# Patient Record
Sex: FEMALE | Race: WHITE | NOT HISPANIC OR LATINO
[De-identification: names, ages, dates, MRNs, and addresses within clinical notes are randomized per-mention and may not be internally consistent; named-entity substitution may affect disease eponyms.]

---

## 2018-04-11 ENCOUNTER — RX RENEWAL (OUTPATIENT)
Age: 43
End: 2018-04-11

## 2018-04-11 PROBLEM — Z00.00 ENCOUNTER FOR PREVENTIVE HEALTH EXAMINATION: Status: ACTIVE | Noted: 2018-04-11

## 2018-04-11 RX ORDER — NORETHINDRONE ACETATE AND ETHINYL ESTRADIOL AND FERROUS FUMARATE 1MG-20(21)
1-20 KIT ORAL DAILY
Qty: 90 | Refills: 0 | Status: ACTIVE | COMMUNITY
Start: 2018-04-11 | End: 1900-01-01

## 2018-05-14 ENCOUNTER — TRANSCRIPTION ENCOUNTER (OUTPATIENT)
Age: 43
End: 2018-05-14

## 2018-05-14 ENCOUNTER — APPOINTMENT (OUTPATIENT)
Dept: OBGYN | Facility: CLINIC | Age: 43
End: 2018-05-14
Payer: COMMERCIAL

## 2018-05-14 VITALS
SYSTOLIC BLOOD PRESSURE: 110 MMHG | DIASTOLIC BLOOD PRESSURE: 60 MMHG | WEIGHT: 110 LBS | BODY MASS INDEX: 18.78 KG/M2 | HEIGHT: 64 IN

## 2018-05-14 LAB
HCG UR QL: NEGATIVE
QUALITY CONTROL: YES

## 2018-05-14 PROCEDURE — 81025 URINE PREGNANCY TEST: CPT

## 2018-05-14 PROCEDURE — 99213 OFFICE O/P EST LOW 20 MIN: CPT

## 2018-09-20 ENCOUNTER — OTHER (OUTPATIENT)
Age: 43
End: 2018-09-20

## 2018-10-23 ENCOUNTER — RX RENEWAL (OUTPATIENT)
Age: 43
End: 2018-10-23

## 2018-11-10 ENCOUNTER — EMERGENCY (EMERGENCY)
Facility: HOSPITAL | Age: 43
LOS: 1 days | End: 2018-11-10
Payer: COMMERCIAL

## 2018-11-10 PROCEDURE — 74177 CT ABD & PELVIS W/CONTRAST: CPT | Mod: 26

## 2018-11-10 PROCEDURE — 99284 EMERGENCY DEPT VISIT MOD MDM: CPT

## 2018-11-10 PROCEDURE — 76856 US EXAM PELVIC COMPLETE: CPT | Mod: 26

## 2018-11-10 PROCEDURE — 76830 TRANSVAGINAL US NON-OB: CPT | Mod: 26

## 2018-11-20 ENCOUNTER — ASOB RESULT (OUTPATIENT)
Age: 43
End: 2018-11-20

## 2018-11-20 ENCOUNTER — APPOINTMENT (OUTPATIENT)
Dept: OBGYN | Facility: CLINIC | Age: 43
End: 2018-11-20
Payer: COMMERCIAL

## 2018-11-20 VITALS
WEIGHT: 110 LBS | HEIGHT: 64 IN | SYSTOLIC BLOOD PRESSURE: 110 MMHG | DIASTOLIC BLOOD PRESSURE: 70 MMHG | BODY MASS INDEX: 18.78 KG/M2

## 2018-11-20 PROCEDURE — 76857 US EXAM PELVIC LIMITED: CPT

## 2018-11-20 PROCEDURE — 99213 OFFICE O/P EST LOW 20 MIN: CPT

## 2018-11-20 PROCEDURE — 76830 TRANSVAGINAL US NON-OB: CPT

## 2018-11-27 ENCOUNTER — OUTPATIENT (OUTPATIENT)
Dept: OUTPATIENT SERVICES | Facility: HOSPITAL | Age: 43
LOS: 1 days | End: 2018-11-27

## 2018-11-27 ENCOUNTER — INPATIENT (INPATIENT)
Facility: HOSPITAL | Age: 43
LOS: 13 days | Discharge: HOME | End: 2018-12-11
Attending: FAMILY MEDICINE
Payer: COMMERCIAL

## 2018-11-27 PROCEDURE — 99285 EMERGENCY DEPT VISIT HI MDM: CPT

## 2018-11-27 PROCEDURE — 71045 X-RAY EXAM CHEST 1 VIEW: CPT | Mod: 26

## 2018-11-28 ENCOUNTER — OUTPATIENT (OUTPATIENT)
Dept: OUTPATIENT SERVICES | Facility: HOSPITAL | Age: 43
LOS: 1 days | End: 2018-11-28

## 2018-11-28 PROCEDURE — 50432 PLMT NEPHROSTOMY CATHETER: CPT | Mod: RT

## 2018-11-30 ENCOUNTER — OUTPATIENT (OUTPATIENT)
Dept: OUTPATIENT SERVICES | Facility: HOSPITAL | Age: 43
LOS: 1 days | End: 2018-11-30

## 2018-11-30 ENCOUNTER — OUTPATIENT (OUTPATIENT)
Dept: OUTPATIENT SERVICES | Facility: HOSPITAL | Age: 43
LOS: 1 days | End: 2018-11-30
Payer: COMMERCIAL

## 2018-11-30 PROCEDURE — 99232 SBSQ HOSP IP/OBS MODERATE 35: CPT

## 2018-12-01 ENCOUNTER — OUTPATIENT (OUTPATIENT)
Dept: OUTPATIENT SERVICES | Facility: HOSPITAL | Age: 43
LOS: 1 days | End: 2018-12-01

## 2018-12-02 ENCOUNTER — OUTPATIENT (OUTPATIENT)
Dept: OUTPATIENT SERVICES | Facility: HOSPITAL | Age: 43
LOS: 1 days | End: 2018-12-02

## 2018-12-03 ENCOUNTER — OUTPATIENT (OUTPATIENT)
Dept: OUTPATIENT SERVICES | Facility: HOSPITAL | Age: 43
LOS: 1 days | End: 2018-12-03

## 2018-12-04 ENCOUNTER — OUTPATIENT (OUTPATIENT)
Dept: OUTPATIENT SERVICES | Facility: HOSPITAL | Age: 43
LOS: 1 days | End: 2018-12-04

## 2018-12-05 ENCOUNTER — OUTPATIENT (OUTPATIENT)
Dept: OUTPATIENT SERVICES | Facility: HOSPITAL | Age: 43
LOS: 1 days | End: 2018-12-05

## 2018-12-05 PROCEDURE — 74177 CT ABD & PELVIS W/CONTRAST: CPT | Mod: 26

## 2018-12-06 ENCOUNTER — OUTPATIENT (OUTPATIENT)
Dept: OUTPATIENT SERVICES | Facility: HOSPITAL | Age: 43
LOS: 1 days | End: 2018-12-06

## 2018-12-06 PROCEDURE — 49406 IMAGE CATH FLUID PERI/RETRO: CPT

## 2018-12-07 ENCOUNTER — OUTPATIENT (OUTPATIENT)
Dept: OUTPATIENT SERVICES | Facility: HOSPITAL | Age: 43
LOS: 1 days | End: 2018-12-07

## 2018-12-08 ENCOUNTER — OUTPATIENT (OUTPATIENT)
Dept: OUTPATIENT SERVICES | Facility: HOSPITAL | Age: 43
LOS: 1 days | End: 2018-12-08

## 2018-12-09 ENCOUNTER — OUTPATIENT (OUTPATIENT)
Dept: OUTPATIENT SERVICES | Facility: HOSPITAL | Age: 43
LOS: 1 days | End: 2018-12-09

## 2018-12-10 ENCOUNTER — OUTPATIENT (OUTPATIENT)
Dept: OUTPATIENT SERVICES | Facility: HOSPITAL | Age: 43
LOS: 1 days | End: 2018-12-10

## 2018-12-10 PROCEDURE — 50434 CONVERT NEPHROSTOMY CATHETER: CPT | Mod: RT

## 2018-12-11 ENCOUNTER — OUTPATIENT (OUTPATIENT)
Dept: OUTPATIENT SERVICES | Facility: HOSPITAL | Age: 43
LOS: 1 days | End: 2018-12-11

## 2018-12-11 PROCEDURE — 36410 VNPNXR 3YR/> PHY/QHP DX/THER: CPT

## 2018-12-11 PROCEDURE — 76937 US GUIDE VASCULAR ACCESS: CPT | Mod: 26

## 2018-12-12 ENCOUNTER — OUTPATIENT (OUTPATIENT)
Dept: OUTPATIENT SERVICES | Facility: HOSPITAL | Age: 43
LOS: 1 days | End: 2018-12-12

## 2019-01-22 ENCOUNTER — RX RENEWAL (OUTPATIENT)
Age: 44
End: 2019-01-22

## 2019-02-08 ENCOUNTER — APPOINTMENT (OUTPATIENT)
Dept: OBGYN | Facility: CLINIC | Age: 44
End: 2019-02-08
Payer: COMMERCIAL

## 2019-02-08 VITALS
WEIGHT: 110 LBS | DIASTOLIC BLOOD PRESSURE: 78 MMHG | BODY MASS INDEX: 18.78 KG/M2 | HEIGHT: 64 IN | SYSTOLIC BLOOD PRESSURE: 114 MMHG

## 2019-02-08 PROCEDURE — 99213 OFFICE O/P EST LOW 20 MIN: CPT

## 2019-02-08 NOTE — DISCUSSION/SUMMARY
[FreeTextEntry1] : a43 years old white female came to the office urgent visit was the ultrasound showed small fibroids physical exam within normal limits pelvic exam within normal limits patient is reassured that the fibroids are small and they were contained in the size she has one nabothian cyst it's responsible for the enlarged cervix were denuded ángela LUNSFORD spent 15 minutes with a

## 2019-02-10 LAB — HPV HIGH+LOW RISK DNA PNL CVX: NOT DETECTED

## 2019-02-15 LAB — CYTOLOGY CVX/VAG DOC THIN PREP: NORMAL

## 2019-02-22 ENCOUNTER — RESULT REVIEW (OUTPATIENT)
Age: 44
End: 2019-02-22

## 2019-04-17 ENCOUNTER — APPOINTMENT (OUTPATIENT)
Dept: NUCLEAR MEDICINE | Facility: CLINIC | Age: 44
End: 2019-04-17
Payer: COMMERCIAL

## 2019-04-17 PROCEDURE — 78708 K FLOW/FUNCT IMAGE W/DRUG: CPT

## 2019-04-17 PROCEDURE — A9539A: CUSTOM

## 2019-05-26 ENCOUNTER — RX RENEWAL (OUTPATIENT)
Age: 44
End: 2019-05-26

## 2019-05-26 ENCOUNTER — TRANSCRIPTION ENCOUNTER (OUTPATIENT)
Age: 44
End: 2019-05-26

## 2019-05-26 RX ORDER — NORETHINDRONE ACETATE AND ETHINYL ESTRADIOL AND FERROUS FUMARATE 1.5-30(21)
1.5-3 KIT ORAL
Qty: 84 | Refills: 1 | Status: ACTIVE | COMMUNITY
Start: 2018-10-23 | End: 1900-01-01

## 2019-06-05 ENCOUNTER — APPOINTMENT (OUTPATIENT)
Dept: UROLOGY | Facility: CLINIC | Age: 44
End: 2019-06-05
Payer: COMMERCIAL

## 2019-06-05 VITALS
DIASTOLIC BLOOD PRESSURE: 81 MMHG | BODY MASS INDEX: 18.78 KG/M2 | WEIGHT: 110 LBS | RESPIRATION RATE: 17 BRPM | SYSTOLIC BLOOD PRESSURE: 111 MMHG | TEMPERATURE: 98.5 F | HEART RATE: 90 BPM | HEIGHT: 64 IN

## 2019-06-05 DIAGNOSIS — Z87.09 PERSONAL HISTORY OF OTHER DISEASES OF THE RESPIRATORY SYSTEM: ICD-10-CM

## 2019-06-05 DIAGNOSIS — Z84.1 FAMILY HISTORY OF DISORDERS OF KIDNEY AND URETER: ICD-10-CM

## 2019-06-05 PROCEDURE — 99213 OFFICE O/P EST LOW 20 MIN: CPT

## 2019-06-05 NOTE — ASSESSMENT
[FreeTextEntry1] : 45 y/o F w/ complex R sided ureteral stricture disease\par - nephrostogram performed today, appears the nephrostomy is not completely in the kidney, after examining the CT scan, it appears the nephrostomy tube may be placed into the renal cyst\par We discussed the options of reconstruction . Depending what we see when we do retrograde pyelogram in the OR , that will determine what next step would be . Reconstruction with Buccal flap , It is unclear by the films today where or if there is a structure or if the cyst is pressing on the ureter or if the stone is causing obstruction

## 2019-06-05 NOTE — HISTORY OF PRESENT ILLNESS
[FreeTextEntry1] : 43 y/o F h/o nephrolithaisis here for evaluation of complex stricture disease. Pt states she was in her usual state of health until she developed renal colic in November in [facility] and she was found to have a R ureteral stone. She was evaluated by Dr. Bipin Mix who attempted to place a stent however the stone was too impacted thus she required a perc nephrostomy tube placement and RP drain. There was extrav at the time of cysto which is why she had a RP drain placed. She had repeat imaging which demonstrated improvement of her collection. They changed her to a nephroureteral tube and performed a ureteroscopy once had access down into her bladder. During the time of ureteroscopy, she was noted to have significant scarring of the proximal ureter and renal pelvis. The lower pole of her kidney apepared to be obliterated. At this time, a stent was placed. A MAG3 renal scan was done showing decreased renal function on the R side as well as suggestion of UPJO. She needed a repeat nephrostomy tube placement after her stent was removed b/c of accumulation of hydronephrosis once again. Antegrade nephrostogram was done once as well as RPG showing a small, intra-renal pelvis and ureter being obliterated at the level of the renal pelvis.\par \par MAG3 renal scan showed 30% right kidney, t1/2 >30 minutes bilaterally

## 2019-06-05 NOTE — PHYSICAL EXAM
[General Appearance - Well Developed] : well developed [Edema] : no peripheral edema [General Appearance - Well Nourished] : well nourished [] : no respiratory distress [Exaggerated Use Of Accessory Muscles For Inspiration] : no accessory muscle use [No Focal Deficits] : no focal deficits [Normal Station and Gait] : the gait and station were normal for the patient's age [FreeTextEntry1] : no suprapubic tenderness, no flank tenderness

## 2019-06-06 PROBLEM — Z87.09 HISTORY OF ASTHMA: Status: RESOLVED | Noted: 2019-06-05 | Resolved: 2019-06-06

## 2019-06-06 PROBLEM — Z84.1 FAMILY HISTORY OF KIDNEY STONES: Status: ACTIVE | Noted: 2019-06-05

## 2019-06-10 LAB — BACTERIA UR CULT: ABNORMAL

## 2019-06-12 ENCOUNTER — OUTPATIENT (OUTPATIENT)
Dept: OUTPATIENT SERVICES | Facility: HOSPITAL | Age: 44
LOS: 1 days | End: 2019-06-12

## 2019-06-12 VITALS
SYSTOLIC BLOOD PRESSURE: 120 MMHG | HEART RATE: 73 BPM | TEMPERATURE: 97 F | WEIGHT: 117.07 LBS | RESPIRATION RATE: 16 BRPM | OXYGEN SATURATION: 98 % | DIASTOLIC BLOOD PRESSURE: 80 MMHG | HEIGHT: 63 IN

## 2019-06-12 DIAGNOSIS — N13.5 CROSSING VESSEL AND STRICTURE OF URETER WITHOUT HYDRONEPHROSIS: ICD-10-CM

## 2019-06-12 DIAGNOSIS — Z98.891 HISTORY OF UTERINE SCAR FROM PREVIOUS SURGERY: Chronic | ICD-10-CM

## 2019-06-12 DIAGNOSIS — J45.909 UNSPECIFIED ASTHMA, UNCOMPLICATED: ICD-10-CM

## 2019-06-12 DIAGNOSIS — Z96.0 PRESENCE OF UROGENITAL IMPLANTS: Chronic | ICD-10-CM

## 2019-06-12 DIAGNOSIS — F41.9 ANXIETY DISORDER, UNSPECIFIED: ICD-10-CM

## 2019-06-12 DIAGNOSIS — Z98.890 OTHER SPECIFIED POSTPROCEDURAL STATES: Chronic | ICD-10-CM

## 2019-06-12 LAB
ANION GAP SERPL CALC-SCNC: 12 MMO/L — SIGNIFICANT CHANGE UP (ref 7–14)
APPEARANCE UR: CLEAR — SIGNIFICANT CHANGE UP
BACTERIA # UR AUTO: SIGNIFICANT CHANGE UP
BILIRUB UR-MCNC: NEGATIVE — SIGNIFICANT CHANGE UP
BLOOD UR QL VISUAL: HIGH
BUN SERPL-MCNC: 20 MG/DL — SIGNIFICANT CHANGE UP (ref 7–23)
CALCIUM SERPL-MCNC: 9.9 MG/DL — SIGNIFICANT CHANGE UP (ref 8.4–10.5)
CHLORIDE SERPL-SCNC: 105 MMOL/L — SIGNIFICANT CHANGE UP (ref 98–107)
CO2 SERPL-SCNC: 23 MMOL/L — SIGNIFICANT CHANGE UP (ref 22–31)
COLOR SPEC: SIGNIFICANT CHANGE UP
CREAT SERPL-MCNC: 0.79 MG/DL — SIGNIFICANT CHANGE UP (ref 0.5–1.3)
GLUCOSE SERPL-MCNC: 105 MG/DL — HIGH (ref 70–99)
GLUCOSE UR-MCNC: NEGATIVE — SIGNIFICANT CHANGE UP
HCT VFR BLD CALC: 40.3 % — SIGNIFICANT CHANGE UP (ref 34.5–45)
HGB BLD-MCNC: 13.3 G/DL — SIGNIFICANT CHANGE UP (ref 11.5–15.5)
HYALINE CASTS # UR AUTO: NEGATIVE — SIGNIFICANT CHANGE UP
KETONES UR-MCNC: NEGATIVE — SIGNIFICANT CHANGE UP
LEUKOCYTE ESTERASE UR-ACNC: SIGNIFICANT CHANGE UP
MCHC RBC-ENTMCNC: 29.9 PG — SIGNIFICANT CHANGE UP (ref 27–34)
MCHC RBC-ENTMCNC: 33 % — SIGNIFICANT CHANGE UP (ref 32–36)
MCV RBC AUTO: 90.6 FL — SIGNIFICANT CHANGE UP (ref 80–100)
NITRITE UR-MCNC: NEGATIVE — SIGNIFICANT CHANGE UP
NRBC # FLD: 0 K/UL — SIGNIFICANT CHANGE UP (ref 0–0)
PH UR: 6.5 — SIGNIFICANT CHANGE UP (ref 5–8)
PLATELET # BLD AUTO: 318 K/UL — SIGNIFICANT CHANGE UP (ref 150–400)
PMV BLD: 9.4 FL — SIGNIFICANT CHANGE UP (ref 7–13)
POTASSIUM SERPL-MCNC: 4.6 MMOL/L — SIGNIFICANT CHANGE UP (ref 3.5–5.3)
POTASSIUM SERPL-SCNC: 4.6 MMOL/L — SIGNIFICANT CHANGE UP (ref 3.5–5.3)
PROT UR-MCNC: 10 — SIGNIFICANT CHANGE UP
RBC # BLD: 4.45 M/UL — SIGNIFICANT CHANGE UP (ref 3.8–5.2)
RBC # FLD: 13.1 % — SIGNIFICANT CHANGE UP (ref 10.3–14.5)
RBC CASTS # UR COMP ASSIST: HIGH (ref 0–?)
SODIUM SERPL-SCNC: 140 MMOL/L — SIGNIFICANT CHANGE UP (ref 135–145)
SP GR SPEC: 1.02 — SIGNIFICANT CHANGE UP (ref 1–1.04)
SQUAMOUS # UR AUTO: SIGNIFICANT CHANGE UP
UROBILINOGEN FLD QL: NORMAL — SIGNIFICANT CHANGE UP
WBC # BLD: 7.05 K/UL — SIGNIFICANT CHANGE UP (ref 3.8–10.5)
WBC # FLD AUTO: 7.05 K/UL — SIGNIFICANT CHANGE UP (ref 3.8–10.5)
WBC UR QL: HIGH (ref 0–?)

## 2019-06-12 NOTE — H&P PST ADULT - NSICDXPROBLEM_GEN_ALL_CORE_FT
PROBLEM DIAGNOSES  Problem: Crossing vessel and stricture of ureter without hydronephrosis  Assessment and Plan: Scheduled for  Cystoscopy, Right Ureteroscopy, Retrograde Pyelogram, Possible Pyeloplasty, Possible Onlay Buccal, Possible Appendical Onlay, Excision of Stone on 06/20/19.  Lab results pending.  Preop, famotidine, urine collection and chlorhexidine written and verbal instructions provided and questions addressed.    Problem: Anxiety and depression  Assessment and Plan: Pt instructed to take meds on the morning of procedure.    Problem: Asthma  Assessment and Plan: Pt instructed to take med on morning of procedure.

## 2019-06-12 NOTE — H&P PST ADULT - RS GEN PE MLT RESP DETAILS PC
respirations non-labored/breath sounds equal/no rhonchi/no wheezes/clear to auscultation bilaterally/no rales

## 2019-06-12 NOTE — H&P PST ADULT - LYMPHATIC
posterior cervical L/supraclavicular L/anterior cervical L/supraclavicular R/posterior cervical R/anterior cervical R

## 2019-06-12 NOTE — H&P PST ADULT - GASTROINTESTINAL DETAILS
soft/no masses palpable/no guarding/no distention/bowel sounds normal/no rebound tenderness/no rigidity/no organomegaly/no bruit/nontender

## 2019-06-12 NOTE — H&P PST ADULT - HISTORY OF PRESENT ILLNESS
44 yr old female with medical hx anxiety/depression and asthma presents for preop evaluation with dx of Crossing Vessel and Stricture of Ureter without Hydronephrosis after having right sided abdominal pains in November 2018.  Pt was evaluated at ED [facility] and was dx with Kidney stones and Urosepsis Pt is now scheduled Cystoscopy, Right Ureteroscopy, Retrograde Pyelogram, Possible Pyeloplasty, Possible Onlay Buccal, Possible Appendical Onlay, Excision of Stone on 06/20/19.

## 2019-06-12 NOTE — H&P PST ADULT - NSANTHOSAYNRD_GEN_A_CORE
No. PENELOPE screening performed.  STOP BANG Legend: 0-2 = LOW Risk; 3-4 = INTERMEDIATE Risk; 5-8 = HIGH Risk

## 2019-06-12 NOTE — H&P PST ADULT - NSICDXPASTSURGICALHX_GEN_ALL_CORE_FT
PAST SURGICAL HISTORY:  H/O cystoscopy removal of stent    History of   &     S/P cystoscopy with ureteral stent placement

## 2019-06-13 LAB — SPECIMEN SOURCE: SIGNIFICANT CHANGE UP

## 2019-06-14 LAB
-  AMIKACIN: SIGNIFICANT CHANGE UP
-  AMPICILLIN/SULBACTAM: SIGNIFICANT CHANGE UP
-  AMPICILLIN: SIGNIFICANT CHANGE UP
-  AZTREONAM: SIGNIFICANT CHANGE UP
-  CEFAZOLIN: SIGNIFICANT CHANGE UP
-  CEFEPIME: SIGNIFICANT CHANGE UP
-  CEFOXITIN: SIGNIFICANT CHANGE UP
-  CEFTAZIDIME: SIGNIFICANT CHANGE UP
-  CEFTRIAXONE: SIGNIFICANT CHANGE UP
-  CIPROFLOXACIN: SIGNIFICANT CHANGE UP
-  ERTAPENEM: SIGNIFICANT CHANGE UP
-  GENTAMICIN: SIGNIFICANT CHANGE UP
-  IMIPENEM: SIGNIFICANT CHANGE UP
-  LEVOFLOXACIN: SIGNIFICANT CHANGE UP
-  MEROPENEM: SIGNIFICANT CHANGE UP
-  NITROFURANTOIN: SIGNIFICANT CHANGE UP
-  PIPERACILLIN/TAZOBACTAM: SIGNIFICANT CHANGE UP
-  TIGECYCLINE: SIGNIFICANT CHANGE UP
-  TOBRAMYCIN: SIGNIFICANT CHANGE UP
-  TRIMETHOPRIM/SULFAMETHOXAZOLE: SIGNIFICANT CHANGE UP
BACTERIA UR CULT: SIGNIFICANT CHANGE UP
METHOD TYPE: SIGNIFICANT CHANGE UP
ORGANISM # SPEC MICROSCOPIC CNT: SIGNIFICANT CHANGE UP

## 2019-06-19 ENCOUNTER — TRANSCRIPTION ENCOUNTER (OUTPATIENT)
Age: 44
End: 2019-06-19

## 2019-06-20 ENCOUNTER — INPATIENT (INPATIENT)
Facility: HOSPITAL | Age: 44
LOS: 1 days | Discharge: HOME | End: 2019-06-22
Attending: UROLOGY | Admitting: UROLOGY
Payer: COMMERCIAL

## 2019-06-20 ENCOUNTER — RESULT REVIEW (OUTPATIENT)
Age: 44
End: 2019-06-20

## 2019-06-20 ENCOUNTER — APPOINTMENT (OUTPATIENT)
Dept: UROLOGY | Facility: HOSPITAL | Age: 44
End: 2019-06-20

## 2019-06-20 VITALS
HEIGHT: 63 IN | HEART RATE: 67 BPM | SYSTOLIC BLOOD PRESSURE: 129 MMHG | WEIGHT: 117.07 LBS | TEMPERATURE: 98 F | RESPIRATION RATE: 15 BRPM | DIASTOLIC BLOOD PRESSURE: 93 MMHG | OXYGEN SATURATION: 98 %

## 2019-06-20 DIAGNOSIS — Z96.0 PRESENCE OF UROGENITAL IMPLANTS: Chronic | ICD-10-CM

## 2019-06-20 DIAGNOSIS — N13.5 CROSSING VESSEL AND STRICTURE OF URETER WITHOUT HYDRONEPHROSIS: ICD-10-CM

## 2019-06-20 DIAGNOSIS — J45.20 MILD INTERMITTENT ASTHMA, UNCOMPLICATED: ICD-10-CM

## 2019-06-20 DIAGNOSIS — Z98.891 HISTORY OF UTERINE SCAR FROM PREVIOUS SURGERY: Chronic | ICD-10-CM

## 2019-06-20 DIAGNOSIS — Z98.890 OTHER SPECIFIED POSTPROCEDURAL STATES: Chronic | ICD-10-CM

## 2019-06-20 DIAGNOSIS — D72.829 ELEVATED WHITE BLOOD CELL COUNT, UNSPECIFIED: ICD-10-CM

## 2019-06-20 LAB
ANION GAP SERPL CALC-SCNC: 14 MMO/L — SIGNIFICANT CHANGE UP (ref 7–14)
BLD GP AB SCN SERPL QL: NEGATIVE — SIGNIFICANT CHANGE UP
BUN SERPL-MCNC: 13 MG/DL — SIGNIFICANT CHANGE UP (ref 7–23)
CALCIUM SERPL-MCNC: 8.1 MG/DL — LOW (ref 8.4–10.5)
CHLORIDE SERPL-SCNC: 100 MMOL/L — SIGNIFICANT CHANGE UP (ref 98–107)
CO2 SERPL-SCNC: 25 MMOL/L — SIGNIFICANT CHANGE UP (ref 22–31)
CREAT SERPL-MCNC: 0.84 MG/DL — SIGNIFICANT CHANGE UP (ref 0.5–1.3)
GLUCOSE SERPL-MCNC: 129 MG/DL — HIGH (ref 70–99)
HCG UR QL: NEGATIVE — SIGNIFICANT CHANGE UP
HCT VFR BLD CALC: 35.5 % — SIGNIFICANT CHANGE UP (ref 34.5–45)
HGB BLD-MCNC: 11.8 G/DL — SIGNIFICANT CHANGE UP (ref 11.5–15.5)
MCHC RBC-ENTMCNC: 30.3 PG — SIGNIFICANT CHANGE UP (ref 27–34)
MCHC RBC-ENTMCNC: 33.2 % — SIGNIFICANT CHANGE UP (ref 32–36)
MCV RBC AUTO: 91.3 FL — SIGNIFICANT CHANGE UP (ref 80–100)
NRBC # FLD: 0 K/UL — SIGNIFICANT CHANGE UP (ref 0–0)
PLATELET # BLD AUTO: 251 K/UL — SIGNIFICANT CHANGE UP (ref 150–400)
PMV BLD: 9.3 FL — SIGNIFICANT CHANGE UP (ref 7–13)
POTASSIUM SERPL-MCNC: 3.5 MMOL/L — SIGNIFICANT CHANGE UP (ref 3.5–5.3)
POTASSIUM SERPL-SCNC: 3.5 MMOL/L — SIGNIFICANT CHANGE UP (ref 3.5–5.3)
RBC # BLD: 3.89 M/UL — SIGNIFICANT CHANGE UP (ref 3.8–5.2)
RBC # FLD: 12.8 % — SIGNIFICANT CHANGE UP (ref 10.3–14.5)
RH IG SCN BLD-IMP: POSITIVE — SIGNIFICANT CHANGE UP
RH IG SCN BLD-IMP: POSITIVE — SIGNIFICANT CHANGE UP
SODIUM SERPL-SCNC: 139 MMOL/L — SIGNIFICANT CHANGE UP (ref 135–145)
WBC # BLD: 15.1 K/UL — HIGH (ref 3.8–10.5)
WBC # FLD AUTO: 15.1 K/UL — HIGH (ref 3.8–10.5)

## 2019-06-20 PROCEDURE — 88305 TISSUE EXAM BY PATHOLOGIST: CPT | Mod: 26

## 2019-06-20 PROCEDURE — 88331 PATH CONSLTJ SURG 1 BLK 1SPC: CPT | Mod: 26

## 2019-06-20 PROCEDURE — 99223 1ST HOSP IP/OBS HIGH 75: CPT

## 2019-06-20 PROCEDURE — 50546 LAPAROSCOPIC NEPHRECTOMY: CPT | Mod: RT

## 2019-06-20 PROCEDURE — 52332 CYSTOSCOPY AND TREATMENT: CPT | Mod: RT

## 2019-06-20 PROCEDURE — 88307 TISSUE EXAM BY PATHOLOGIST: CPT | Mod: 26

## 2019-06-20 RX ORDER — ACETAMINOPHEN 500 MG
1000 TABLET ORAL ONCE
Refills: 0 | Status: COMPLETED | OUTPATIENT
Start: 2019-06-20 | End: 2019-06-20

## 2019-06-20 RX ORDER — MONTELUKAST 4 MG/1
10 TABLET, CHEWABLE ORAL AT BEDTIME
Refills: 0 | Status: DISCONTINUED | OUTPATIENT
Start: 2019-06-20 | End: 2019-06-22

## 2019-06-20 RX ORDER — ACETAMINOPHEN 500 MG
1000 TABLET ORAL ONCE
Refills: 0 | Status: COMPLETED | OUTPATIENT
Start: 2019-06-21 | End: 2019-06-21

## 2019-06-20 RX ORDER — SERTRALINE 25 MG/1
100 TABLET, FILM COATED ORAL AT BEDTIME
Refills: 0 | Status: DISCONTINUED | OUTPATIENT
Start: 2019-06-20 | End: 2019-06-22

## 2019-06-20 RX ORDER — ERTAPENEM SODIUM 1 G/1
1000 INJECTION, POWDER, LYOPHILIZED, FOR SOLUTION INTRAMUSCULAR; INTRAVENOUS ONCE
Refills: 0 | Status: DISCONTINUED | OUTPATIENT
Start: 2019-06-20 | End: 2019-06-20

## 2019-06-20 RX ORDER — SERTRALINE 25 MG/1
2 TABLET, FILM COATED ORAL
Qty: 0 | Refills: 0 | DISCHARGE

## 2019-06-20 RX ORDER — KETOROLAC TROMETHAMINE 30 MG/ML
15 SYRINGE (ML) INJECTION EVERY 6 HOURS
Refills: 0 | Status: DISCONTINUED | OUTPATIENT
Start: 2019-06-20 | End: 2019-06-22

## 2019-06-20 RX ORDER — ONDANSETRON 8 MG/1
4 TABLET, FILM COATED ORAL ONCE
Refills: 0 | Status: COMPLETED | OUTPATIENT
Start: 2019-06-20 | End: 2019-06-20

## 2019-06-20 RX ORDER — ALBUTEROL 90 UG/1
2 AEROSOL, METERED ORAL EVERY 6 HOURS
Refills: 0 | Status: DISCONTINUED | OUTPATIENT
Start: 2019-06-20 | End: 2019-06-22

## 2019-06-20 RX ORDER — POTASSIUM CHLORIDE 20 MEQ
10 PACKET (EA) ORAL
Refills: 0 | Status: COMPLETED | OUTPATIENT
Start: 2019-06-20 | End: 2019-06-20

## 2019-06-20 RX ORDER — POTASSIUM CHLORIDE 20 MEQ
40 PACKET (EA) ORAL ONCE
Refills: 0 | Status: DISCONTINUED | OUTPATIENT
Start: 2019-06-20 | End: 2019-06-20

## 2019-06-20 RX ORDER — MONTELUKAST 4 MG/1
1 TABLET, CHEWABLE ORAL
Qty: 0 | Refills: 0 | DISCHARGE

## 2019-06-20 RX ORDER — LANOLIN ALCOHOL/MO/W.PET/CERES
1 CREAM (GRAM) TOPICAL
Qty: 0 | Refills: 0 | DISCHARGE

## 2019-06-20 RX ORDER — SERTRALINE 25 MG/1
200 TABLET, FILM COATED ORAL
Refills: 0 | Status: DISCONTINUED | OUTPATIENT
Start: 2019-06-20 | End: 2019-06-20

## 2019-06-20 RX ORDER — ALBUTEROL 90 UG/1
2 AEROSOL, METERED ORAL
Qty: 0 | Refills: 0 | DISCHARGE

## 2019-06-20 RX ORDER — SODIUM CHLORIDE 9 MG/ML
1000 INJECTION, SOLUTION INTRAVENOUS
Refills: 0 | Status: DISCONTINUED | OUTPATIENT
Start: 2019-06-20 | End: 2019-06-21

## 2019-06-20 RX ORDER — NORETHINDRONE AND ETHINYL ESTRADIOL 0.4-0.035
1 KIT ORAL DAILY
Refills: 0 | Status: DISCONTINUED | OUTPATIENT
Start: 2019-06-20 | End: 2019-06-22

## 2019-06-20 RX ORDER — ERTAPENEM SODIUM 1 G/1
INJECTION, POWDER, LYOPHILIZED, FOR SOLUTION INTRAMUSCULAR; INTRAVENOUS
Refills: 0 | Status: DISCONTINUED | OUTPATIENT
Start: 2019-06-20 | End: 2019-06-20

## 2019-06-20 RX ORDER — HEPARIN SODIUM 5000 [USP'U]/ML
5000 INJECTION INTRAVENOUS; SUBCUTANEOUS EVERY 8 HOURS
Refills: 0 | Status: DISCONTINUED | OUTPATIENT
Start: 2019-06-20 | End: 2019-06-22

## 2019-06-20 RX ORDER — HYDROMORPHONE HYDROCHLORIDE 2 MG/ML
0.5 INJECTION INTRAMUSCULAR; INTRAVENOUS; SUBCUTANEOUS
Refills: 0 | Status: DISCONTINUED | OUTPATIENT
Start: 2019-06-20 | End: 2019-06-20

## 2019-06-20 RX ORDER — NORETHINDRONE AND ETHINYL ESTRADIOL 0.4-0.035
1 KIT ORAL
Qty: 0 | Refills: 0 | DISCHARGE

## 2019-06-20 RX ADMIN — Medication 400 MILLIGRAM(S): at 20:03

## 2019-06-20 RX ADMIN — Medication 400 MILLIGRAM(S): at 14:41

## 2019-06-20 RX ADMIN — Medication 1000 MILLIGRAM(S): at 15:55

## 2019-06-20 RX ADMIN — HEPARIN SODIUM 5000 UNIT(S): 5000 INJECTION INTRAVENOUS; SUBCUTANEOUS at 22:01

## 2019-06-20 RX ADMIN — HEPARIN SODIUM 5000 UNIT(S): 5000 INJECTION INTRAVENOUS; SUBCUTANEOUS at 14:41

## 2019-06-20 RX ADMIN — ONDANSETRON 4 MILLIGRAM(S): 8 TABLET, FILM COATED ORAL at 15:00

## 2019-06-20 RX ADMIN — MONTELUKAST 10 MILLIGRAM(S): 4 TABLET, CHEWABLE ORAL at 22:01

## 2019-06-20 RX ADMIN — Medication 15 MILLIGRAM(S): at 18:08

## 2019-06-20 RX ADMIN — SODIUM CHLORIDE 100 MILLILITER(S): 9 INJECTION, SOLUTION INTRAVENOUS at 22:02

## 2019-06-20 RX ADMIN — Medication 100 MILLIEQUIVALENT(S): at 16:20

## 2019-06-20 RX ADMIN — Medication 15 MILLIGRAM(S): at 17:42

## 2019-06-20 RX ADMIN — NORETHINDRONE AND ETHINYL ESTRADIOL 1 TABLET(S): KIT at 22:01

## 2019-06-20 RX ADMIN — SERTRALINE 100 MILLIGRAM(S): 25 TABLET, FILM COATED ORAL at 22:01

## 2019-06-20 RX ADMIN — Medication 100 MILLIEQUIVALENT(S): at 16:46

## 2019-06-20 RX ADMIN — Medication 15 MILLIGRAM(S): at 22:01

## 2019-06-20 RX ADMIN — SODIUM CHLORIDE 100 MILLILITER(S): 9 INJECTION, SOLUTION INTRAVENOUS at 15:00

## 2019-06-20 RX ADMIN — Medication 100 MILLIEQUIVALENT(S): at 19:43

## 2019-06-20 NOTE — CONSULT NOTE ADULT - PROBLEM SELECTOR RECOMMENDATION 4
post op management per urology, early ambulation, await return of bowel function, pain control, IVFs, IS, DVT ppx (SC hep)

## 2019-06-20 NOTE — CONSULT NOTE ADULT - ASSESSMENT
44F anxiety/depression, asthma, in November 2018 seen at [facility] for R sided abdominal pain, and was dx with Kidney stones and Urosepsis, found to have R ureteral stricture, ended up with nephrostomy tube.  Now s/p R lap radical nephrectomy.

## 2019-06-20 NOTE — CONSULT NOTE ADULT - SUBJECTIVE AND OBJECTIVE BOX
Patient is a 44y old  Female who presents with a chief complaint of R ureteral stricture (2019 16:51)    HPI:  44F anxiety/depression, asthma presents for preop evaluation with dx of Crossing Vessel and Stricture of Ureter without Hydronephrosis after having right sided abdominal pains in 2018.  Pt was evaluated at [facility] and was dx with Kidney stones and Urosepsis.      Allergies:  No Known Allergies    HOME MEDICATIONS:  · 	sertraline 100 mg oral tablet: Last Dose Taken:  , 2 tab(s) orally 2 times a day  · 	busPIRone 15 mg oral tablet: Last Dose Taken:  , 1 tab(s) orally 2 times a day  · 	Singulair 10 mg oral tablet: Last Dose Taken:  , 1 tab(s) orally once a day (at bedtime)  · 	Junel Fe .530 oral tablet: Last Dose Taken:  , 1 tab(s) orally once a day (at bedtime)  · 	albuterol 90 mcg/inh inhalation aerosol: Last Dose Taken:  , 2 puff(s) inhaled 4 times a day, As Needed  · 	Melatonin 10 mg oral capsule: Last Dose Taken:  , 1 cap(s) orally once a day (at bedtime)    MEDICATIONS  (STANDING):  acetaminophen  IVPB .. 1000 milliGRAM(s) IV Intermittent once  acetaminophen  IVPB .. 1000 milliGRAM(s) IV Intermittent once  busPIRone 15 milliGRAM(s) Oral two times a day  heparin  Injectable 5000 Unit(s) SubCutaneous every 8 hours  ketorolac   Injectable 15 milliGRAM(s) IV Push every 6 hours  lactated ringers. 1000 milliLiter(s) (100 mL/Hr) IV Continuous <Continuous>  montelukast 10 milliGRAM(s) Oral at bedtime  potassium chloride  10 mEq/100 mL IVPB 10 milliEquivalent(s) IV Intermittent every 1 hour  sertraline 200 milliGRAM(s) Oral two times a day    MEDICATIONS  (PRN):  ALBUTerol    90 MICROgram(s) HFA Inhaler 2 Puff(s) Inhalation every 6 hours PRN Shortness of Breath and/or Wheezing  HYDROmorphone  Injectable 0.5 milliGRAM(s) IV Push every 10 minutes PRN Severe Pain (7 - 10)      PAST MEDICAL & SURGICAL HISTORY:  Asthma  Anxiety and depression  History of :  &amp;   H/O cystoscopy: removal of stent  S/P cystoscopy with ureteral stent placement  [ ] Reviewed     SOCIAL HISTORY:  Residence: [ ] [facility]  [ ] SNF  [ ] Community  [ ] Substance abuse:   [ ] Tobacco:   [ ] Alcohol use:     FAMILY HISTORY:  No pertinent family history in first degree relatives  [ ] No pertinent family history in first degree relatives     REVIEW OF SYSTEMS:  CONSTITUTIONAL: No fever, weight loss, or fatigue  EYES: No eye pain, visual disturbances, or discharge  ENMT:  No difficulty hearing, tinnitus, vertigo; No sinus or throat pain  NECK: No pain or stiffness  BREASTS: No pain, masses, or nipple discharge  RESPIRATORY: No cough, wheezing, chills or hemoptysis; No shortness of breath  CARDIOVASCULAR: No chest pain, palpitations, dizziness, or leg swelling  GASTROINTESTINAL: No epigastric pain. No vomiting, or hematemesis; No diarrhea or constipation. No melena or hematochezia.  GENITOURINARY: R NT about one month  NEUROLOGICAL: No headaches, memory loss, loss of strength, or tremors  SKIN: No itching, burning, rashes, or lesions   LYMPH NODES: No enlarged glands  ENDOCRINE: No heat or cold intolerance; No hair loss  MUSCULOSKELETAL: No muscle or back pain  PSYCHIATRIC: No depression, anxiety, mood swings, or difficulty sleeping  HEME/LYMPH: No easy bruising, or bleeding gums  ALLERGY AND IMMUNOLOGIC: No hives or eczema  [ X ] All other ROS negative  [  ] Unable to obtain due to poor mental status    Vital Signs Last 24 Hrs  T(C): 36.4 (2019 16:54), Max: 36.9 (2019 15:00)  T(F): 97.6 (2019 16:54), Max: 98.5 (2019 15:00)  HR: 69 (2019 16:54) (67 - 80)  BP: 106/66 (2019 16:54) (103/62 - 129/93)  BP(mean): 75 (2019 15:00) (68 - 77)  RR: 17 (2019 16:54) (13 - 22)  SpO2: 95% (2019 16:54) (95% - 98%)    PHYSICAL EXAM:  GENERAL: NAD, well-groomed, well-developed  HEAD:  Atraumatic, Normocephalic  EYES: EOMI, PERRLA, conjunctiva and sclera clear  ENMT: Moist mucous membranes  NECK: Supple, No JVD  RESPIRATORY: Clear to auscultation bilaterally; No rales, rhonchi, wheezing, or rubs  CARDIOVASCULAR: Regular rate and rhythm; No murmurs, rubs, or gallops  GASTROINTESTINAL: Soft, incisions intact;  R NT removed, site clean  GENITOURINARY: +gibbs  EXTREMITIES:  2+ Peripheral Pulses, No clubbing, cyanosis, or edema  NERVOUS SYSTEM:  Alert & Oriented X3; Moving all 4 extremities; No gross sensory deficits  HEME/LYMPH: No lymphadenopathy noted  SKIN: No rashes or lesions  PSYCH: calm, appropriate    LABS:                        11.8   15.10 )-----------( 251      ( 2019 13:30 )             35.5     06-20    139  |  100  |  13  ----------------------------<  129<H>  3.5   |  25  |  0.84    Ca    8.1<L>      2019 13:30    RADIOLOGY & ADDITIONAL STUDIES:    EKG:   Personally Reviewed:  [ ] YES     Imaging:   Personally Reviewed:  [ ] YES               Consultant(s) notes reviewed:    Care Discussed with Consultant(s)/Other Providers:  urology re overall care Patient is a 44y old  Female who presents with a chief complaint of R ureteral stricture (2019 16:51)    HPI:  44F anxiety/depression, asthma (no h/o intubation), in 2018 seen at [facility] for R sided abdominal pain, and was dx with Kidney stones and Urosepsis, found to have R ureteral stricture, ended up with nephrostomy tube.  Now s/p R lap radical nephrectomy.  Doing ok after surgery.  Feels weak and tired.  Pain at surgical sites as expected.  No chest pain, SOB.  Mild nausea after surgery, no vomiting.  Since able to tolerate some sips of water.      Allergies:  No Known Allergies    HOME MEDICATIONS:  · 	sertraline 100 mg oral tablet: Last Dose Taken:  ,taking 100hs  · 	busPIRone 15 mg oral tablet: Last Dose Taken:  taking 15hs  · 	Singulair 10 mg oral tablet: Last Dose Taken:  , 1 tab(s) orally once a day (at bedtime)  · 	Junel Fe  oral tablet: Last Dose Taken:  , 1 tab(s) orally once a day (at bedtime)  · 	albuterol 90 mcg/inh inhalation aerosol: Last Dose Taken:  , 2 puff(s) inhaled 4 times a day, As Needed - not used in >1month  · 	Melatonin 10 mg oral capsule: Last Dose Taken:  , 1 cap(s) orally once a day (at bedtime) PRN    MEDICATIONS  (STANDING):  acetaminophen  IVPB .. 1000 milliGRAM(s) IV Intermittent once  acetaminophen  IVPB .. 1000 milliGRAM(s) IV Intermittent once  busPIRone 15 milliGRAM(s) Oral two times a day  heparin  Injectable 5000 Unit(s) SubCutaneous every 8 hours  ketorolac   Injectable 15 milliGRAM(s) IV Push every 6 hours  lactated ringers. 1000 milliLiter(s) (100 mL/Hr) IV Continuous <Continuous>  montelukast 10 milliGRAM(s) Oral at bedtime  potassium chloride  10 mEq/100 mL IVPB 10 milliEquivalent(s) IV Intermittent every 1 hour  sertraline 200 milliGRAM(s) Oral two times a day    MEDICATIONS  (PRN):  ALBUTerol    90 MICROgram(s) HFA Inhaler 2 Puff(s) Inhalation every 6 hours PRN Shortness of Breath and/or Wheezing  HYDROmorphone  Injectable 0.5 milliGRAM(s) IV Push every 10 minutes PRN Severe Pain (7 - 10)    PAST MEDICAL & SURGICAL HISTORY:  Asthma  Anxiety and depression  History of : 2004 &amp; 2005  H/O cystoscopy: removal of stent  S/P cystoscopy with ureteral stent placement    SOCIAL HISTORY:  , 2 children, no tob/drugs, rare alcohol    FAMILY HISTORY:  No pertinent family history in first degree relatives    REVIEW OF SYSTEMS:  CONSTITUTIONAL: No fever, weight loss, or fatigue  EYES: No eye pain, visual disturbances, or discharge  ENMT:  No difficulty hearing, tinnitus, vertigo; No sinus or throat pain  NECK: No pain or stiffness  BREASTS: No pain, masses, or nipple discharge  RESPIRATORY: No cough, wheezing, chills or hemoptysis; No shortness of breath  CARDIOVASCULAR: No chest pain, palpitations, dizziness, or leg swelling  GASTROINTESTINAL: No epigastric pain. No vomiting, or hematemesis; No diarrhea or constipation. No melena or hematochezia.  GENITOURINARY: R NT about one month  NEUROLOGICAL: No headaches, memory loss, loss of strength, or tremors  SKIN: No itching, burning, rashes, or lesions   LYMPH NODES: No enlarged glands  ENDOCRINE: No heat or cold intolerance; No hair loss  MUSCULOSKELETAL: No muscle or back pain  PSYCHIATRIC: No depression, anxiety, mood swings, or difficulty sleeping  HEME/LYMPH: No easy bruising, or bleeding gums  ALLERGY AND IMMUNOLOGIC: No hives or eczema  [ X ] All other ROS negative  [  ] Unable to obtain due to poor mental status    Vital Signs Last 24 Hrs  T(C): 36.4 (2019 16:54), Max: 36.9 (2019 15:00)  T(F): 97.6 (2019 16:54), Max: 98.5 (2019 15:00)  HR: 69 (2019 16:54) (67 - 80)  BP: 106/66 (2019 16:54) (103/62 - 129/93)  BP(mean): 75 (2019 15:00) (68 - 77)  RR: 17 (2019 16:54) (13 - 22)  SpO2: 95% (2019 16:54) (95% - 98%)    PHYSICAL EXAM:  GENERAL: NAD, well-groomed, well-developed  HEAD:  Atraumatic, Normocephalic  EYES: EOMI, PERRLA, conjunctiva and sclera clear  ENMT: Moist mucous membranes  NECK: Supple, No JVD  RESPIRATORY: Clear to auscultation bilaterally; No rales, rhonchi, wheezing, or rubs  CARDIOVASCULAR: Regular rate and rhythm; No murmurs, rubs, or gallops  GASTROINTESTINAL: Soft, incisions intact;  R NT removed, site clean  GENITOURINARY: +gibbs  EXTREMITIES:  2+ Peripheral Pulses, No clubbing, cyanosis, or edema  NERVOUS SYSTEM:  Alert & Oriented X3; Moving all 4 extremities; No gross sensory deficits  HEME/LYMPH: No lymphadenopathy noted  SKIN: No rashes or lesions  PSYCH: calm, appropriate    LABS:                        11.8   15.10 )-----------( 251      ( 2019 13:30 )             35.5     06-20    139  |  100  |  13  ----------------------------<  129<H>  3.5   |  25  |  0.84    Ca    8.1<L>      2019 13:30    RADIOLOGY & ADDITIONAL STUDIES:    EKG:   Personally Reviewed:  [ ] YES     Imaging:   Personally Reviewed:  [ ] YES               Consultant(s) notes reviewed:    Care Discussed with Consultant(s)/Other Providers:  urology re overall care

## 2019-06-20 NOTE — PROGRESS NOTE ADULT - SUBJECTIVE AND OBJECTIVE BOX
Note    Post op Check    s/p : R simple nephrectomy    Pt seen / examined c/o R finger numbness, abdominal pain 5/10    Vital Signs Last 24 Hrs  T(C): 36.9 (20 Jun 2019 15:00), Max: 36.9 (20 Jun 2019 15:00)  T(F): 98.5 (20 Jun 2019 15:00), Max: 98.5 (20 Jun 2019 15:00)  HR: 76 (20 Jun 2019 15:00) (67 - 80)  BP: 106/65 (20 Jun 2019 15:00) (103/62 - 129/93)  BP(mean): 75 (20 Jun 2019 15:00) (68 - 77)  RR: 18 (20 Jun 2019 15:00) (13 - 22)  SpO2: 95% (20 Jun 2019 15:00) (95% - 98%)    I&O's Summary    20 Jun 2019 07:01  -  20 Jun 2019 16:52  --------------------------------------------------------  IN: 200 mL / OUT: 1450 mL / NET: -1250 mL    EBL=1000  Fol=1450    PHYSICAL EXAM:       Constitutional: awake alert NAD    Respiratory: no resp distress    Cardiovascular: RR    Gastrointestinal: soft, appropriately tender trocar sites CDI    Genitourinary: gibbs in place draining well yellow    Extremities: + decreased sensation in third fourth and fifth digits of R hand, ROM and strength intact cap refill 2+                          11.8   15.10 )-----------( 251      ( 20 Jun 2019 13:30 )             35.5       06-20    139  |  100  |  13  ----------------------------<  129<H>  3.5   |  25  |  0.84    Ca    8.1<L>      20 Jun 2019 13:30

## 2019-06-20 NOTE — CONSULT NOTE ADULT - PROBLEM SELECTOR RECOMMENDATION 3
post op management per urology, early ambulation, await return of bowel function, pain control, IVFs, IS, DVT ppx (SC hep) c/w singulair, albuterol PRN

## 2019-06-21 ENCOUNTER — TRANSCRIPTION ENCOUNTER (OUTPATIENT)
Age: 44
End: 2019-06-21

## 2019-06-21 LAB
ANION GAP SERPL CALC-SCNC: 9 MMO/L — SIGNIFICANT CHANGE UP (ref 7–14)
BUN SERPL-MCNC: 9 MG/DL — SIGNIFICANT CHANGE UP (ref 7–23)
CALCIUM SERPL-MCNC: 8.4 MG/DL — SIGNIFICANT CHANGE UP (ref 8.4–10.5)
CHLORIDE SERPL-SCNC: 102 MMOL/L — SIGNIFICANT CHANGE UP (ref 98–107)
CO2 SERPL-SCNC: 25 MMOL/L — SIGNIFICANT CHANGE UP (ref 22–31)
CREAT SERPL-MCNC: 0.84 MG/DL — SIGNIFICANT CHANGE UP (ref 0.5–1.3)
GLUCOSE SERPL-MCNC: 97 MG/DL — SIGNIFICANT CHANGE UP (ref 70–99)
HCT VFR BLD CALC: 30 % — LOW (ref 34.5–45)
HGB BLD-MCNC: 10 G/DL — LOW (ref 11.5–15.5)
MCHC RBC-ENTMCNC: 30.4 PG — SIGNIFICANT CHANGE UP (ref 27–34)
MCHC RBC-ENTMCNC: 33.3 % — SIGNIFICANT CHANGE UP (ref 32–36)
MCV RBC AUTO: 91.2 FL — SIGNIFICANT CHANGE UP (ref 80–100)
NRBC # FLD: 0 K/UL — SIGNIFICANT CHANGE UP (ref 0–0)
PLATELET # BLD AUTO: 248 K/UL — SIGNIFICANT CHANGE UP (ref 150–400)
PMV BLD: 9.7 FL — SIGNIFICANT CHANGE UP (ref 7–13)
POTASSIUM SERPL-MCNC: 4.3 MMOL/L — SIGNIFICANT CHANGE UP (ref 3.5–5.3)
POTASSIUM SERPL-SCNC: 4.3 MMOL/L — SIGNIFICANT CHANGE UP (ref 3.5–5.3)
RBC # BLD: 3.29 M/UL — LOW (ref 3.8–5.2)
RBC # FLD: 12.7 % — SIGNIFICANT CHANGE UP (ref 10.3–14.5)
SODIUM SERPL-SCNC: 136 MMOL/L — SIGNIFICANT CHANGE UP (ref 135–145)
WBC # BLD: 10.85 K/UL — HIGH (ref 3.8–10.5)
WBC # FLD AUTO: 10.85 K/UL — HIGH (ref 3.8–10.5)

## 2019-06-21 PROCEDURE — 99232 SBSQ HOSP IP/OBS MODERATE 35: CPT

## 2019-06-21 RX ORDER — SODIUM CHLORIDE 9 MG/ML
1000 INJECTION, SOLUTION INTRAVENOUS
Refills: 0 | Status: DISCONTINUED | OUTPATIENT
Start: 2019-06-21 | End: 2019-06-21

## 2019-06-21 RX ORDER — ACETAMINOPHEN 500 MG
650 TABLET ORAL EVERY 6 HOURS
Refills: 0 | Status: DISCONTINUED | OUTPATIENT
Start: 2019-06-21 | End: 2019-06-22

## 2019-06-21 RX ORDER — LANOLIN ALCOHOL/MO/W.PET/CERES
3 CREAM (GRAM) TOPICAL AT BEDTIME
Refills: 0 | Status: DISCONTINUED | OUTPATIENT
Start: 2019-06-21 | End: 2019-06-22

## 2019-06-21 RX ADMIN — Medication 15 MILLIGRAM(S): at 11:15

## 2019-06-21 RX ADMIN — Medication 15 MILLIGRAM(S): at 13:43

## 2019-06-21 RX ADMIN — Medication 15 MILLIGRAM(S): at 18:29

## 2019-06-21 RX ADMIN — Medication 15 MILLIGRAM(S): at 05:24

## 2019-06-21 RX ADMIN — Medication 400 MILLIGRAM(S): at 02:34

## 2019-06-21 RX ADMIN — Medication 650 MILLIGRAM(S): at 21:14

## 2019-06-21 RX ADMIN — HEPARIN SODIUM 5000 UNIT(S): 5000 INJECTION INTRAVENOUS; SUBCUTANEOUS at 05:24

## 2019-06-21 RX ADMIN — Medication 15 MILLIGRAM(S): at 18:28

## 2019-06-21 RX ADMIN — HEPARIN SODIUM 5000 UNIT(S): 5000 INJECTION INTRAVENOUS; SUBCUTANEOUS at 15:21

## 2019-06-21 RX ADMIN — Medication 15 MILLIGRAM(S): at 21:13

## 2019-06-21 RX ADMIN — HEPARIN SODIUM 5000 UNIT(S): 5000 INJECTION INTRAVENOUS; SUBCUTANEOUS at 21:14

## 2019-06-21 RX ADMIN — SERTRALINE 100 MILLIGRAM(S): 25 TABLET, FILM COATED ORAL at 21:13

## 2019-06-21 RX ADMIN — Medication 10 MILLIGRAM(S): at 09:29

## 2019-06-21 RX ADMIN — Medication 400 MILLIGRAM(S): at 07:57

## 2019-06-21 RX ADMIN — SODIUM CHLORIDE 75 MILLILITER(S): 9 INJECTION, SOLUTION INTRAVENOUS at 08:17

## 2019-06-21 RX ADMIN — MONTELUKAST 10 MILLIGRAM(S): 4 TABLET, CHEWABLE ORAL at 21:13

## 2019-06-21 RX ADMIN — Medication 15 MILLIGRAM(S): at 00:54

## 2019-06-21 RX ADMIN — Medication 3 MILLIGRAM(S): at 21:14

## 2019-06-21 RX ADMIN — NORETHINDRONE AND ETHINYL ESTRADIOL 1 TABLET(S): KIT at 21:13

## 2019-06-21 RX ADMIN — Medication 650 MILLIGRAM(S): at 22:10

## 2019-06-21 RX ADMIN — SODIUM CHLORIDE 100 MILLILITER(S): 9 INJECTION, SOLUTION INTRAVENOUS at 00:54

## 2019-06-21 NOTE — PROGRESS NOTE ADULT - PROBLEM SELECTOR PLAN 1
Strict I&O's  Analgesia Antiemetics  DVT prophylaxis  Incentive spirometry  Clears / OOB  AM labs  Dulcolax this AM, will advance diet as tolerated  appreciate medicine hospitalist input

## 2019-06-21 NOTE — DISCHARGE NOTE NURSING/CASE MANAGEMENT/SOCIAL WORK - NSDCDPATPORTLINK_GEN_ALL_CORE
You can access the [facility] Portal, offered by [facility], by registering with the following website: [email or web address]

## 2019-06-21 NOTE — DISCHARGE NOTE NURSING/CASE MANAGEMENT/SOCIAL WORK - NSDPDISTO_GEN_ALL_CORE
Home/Pt. is afebrile and offers no complaints. In no acute distress. Abdominal steri strips: clean, dry and intact. Pt is ambulating,  tolerating diet well, and voiding in adequate amounts.

## 2019-06-21 NOTE — DISCHARGE NOTE NURSING/CASE MANAGEMENT/SOCIAL WORK - NSDCPNINST_GEN_ALL_CORE
Make a follow up appointment with Dr. Grajeda. Call MD if you develop a fever, or if there is redness, swelling, drainage or pain not relieved by pain medication. No heavy lifting, bending, or straining to move your bowels. Take over the counter stool softeners as needed to prevent constipation which may be caused by pain medication. Drink plenty of liquids.

## 2019-06-21 NOTE — PROGRESS NOTE ADULT - SUBJECTIVE AND OBJECTIVE BOX
Subjective  Reports mild abdominal soreness  Passed 1 watery BM overnight, awaiting flatus, feels bloated    Objective    Vital signs  T(F): , Max: 98.6 (06-20-19 @ 20:44)  HR: 80 (06-21-19 @ 05:21)  BP: 90/54 (06-21-19 @ 05:21)  SpO2: 99% (06-21-19 @ 05:21)  Gibbs - 2L clear yellow    Gen: NAD  Abd: Soft, incisions c/d/i with steri-strips in place  : gibbs removed, draining as above    Labs      06-20 @ 13:30    WBC 15.10 / Hct 35.5  / SCr 0.84

## 2019-06-21 NOTE — PROGRESS NOTE ADULT - SUBJECTIVE AND OBJECTIVE BOX
ANESTHESIA POSTOP CHECK    44y Female POSTOP DAY 1 S/P   [x ] General Anesthesia  [ ] Joni Anesthesia  [ ] MAC    Vital Signs Last 24 Hrs  T(C): 37.1 (21 Jun 2019 09:26), Max: 37.1 (21 Jun 2019 09:26)  T(F): 98.7 (21 Jun 2019 09:26), Max: 98.7 (21 Jun 2019 09:26)  HR: 79 (21 Jun 2019 09:26) (68 - 80)  BP: 93/54 (21 Jun 2019 09:26) (90/54 - 111/65)  BP(mean): 75 (20 Jun 2019 15:00) (68 - 77)  RR: 16 (21 Jun 2019 09:26) (13 - 22)  SpO2: 99% (21 Jun 2019 09:26) (95% - 99%)  I&O's Summary    20 Jun 2019 07:01  -  21 Jun 2019 07:00  --------------------------------------------------------  IN: 600 mL / OUT: 3600 mL / NET: -3000 mL        [x ] NO APPARENT ANESTHESIA COMPLICATIONS      Comments:

## 2019-06-21 NOTE — PROGRESS NOTE ADULT - ASSESSMENT
44F anxiety/depression, asthma, in November 2018 seen at [facility] for R sided abdominal pain, and was dx with Kidney stones and Urosepsis, found to have R ureteral stricture, ended up with nephrostomy tube.  Now s/p R lap radical nephrectomy.

## 2019-06-21 NOTE — PROGRESS NOTE ADULT - SUBJECTIVE AND OBJECTIVE BOX
Patient is a 44y old  Female who presents with a chief complaint of renal mass (21 Jun 2019 07:08)      SUBJECTIVE / OVERNIGHT EVENTS: no events feels well     ROS:  No HA/DZ  No Vision changes   No CP, SOB  No N/V/D  No Edema  No Rash  NO weakness, numbness    MEDICATIONS  (STANDING):  busPIRone 15 milliGRAM(s) Oral <User Schedule>  ethinyl  estradiol-norethindrone (JUNEL FE) 1 Tablet(s) Oral daily  heparin  Injectable 5000 Unit(s) SubCutaneous every 8 hours  ketorolac   Injectable 15 milliGRAM(s) IV Push every 6 hours  montelukast 10 milliGRAM(s) Oral at bedtime  sertraline 100 milliGRAM(s) Oral at bedtime    MEDICATIONS  (PRN):  ALBUTerol    90 MICROgram(s) HFA Inhaler 2 Puff(s) Inhalation every 6 hours PRN Shortness of Breath and/or Wheezing      T(C): 37.1 (06-21-19 @ 09:26)  HR: 79 (06-21-19 @ 09:26)  BP: 93/54 (06-21-19 @ 09:26)  RR: 16 (06-21-19 @ 09:26)  SpO2: 99% (06-21-19 @ 09:26)  CAPILLARY BLOOD GLUCOSE        I&O's Summary    20 Jun 2019 07:01  -  21 Jun 2019 07:00  --------------------------------------------------------  IN: 600 mL / OUT: 3600 mL / NET: -3000 mL    21 Jun 2019 07:01  -  21 Jun 2019 10:57  --------------------------------------------------------  IN: 0 mL / OUT: 400 mL / NET: -400 mL        PHYSICAL EXAM:  GENERAL: NAD, well-developed, AOx3  HEAD:  Atraumatic, Normocephalic  EYES: EOMI, PERRL, conjunctiva and sclera clear  NECK: Supple, No JVD  CHEST/LUNG: Clear to auscultation bilaterally  HEART: Regular rate and rhythm; No murmurs, rubs, or gallops, No Edema  ABDOMEN: Soft, Nontender, Nondistended; Bowel sounds present  EXTREMITIES:  2+ Peripheral Pulses, No clubbing, cyanosis  PSYCH: No SI/HI  NEUROLOGY: non-focal  SKIN: Dressing c/D/I     LABS:                        10.0   10.85 )-----------( 248      ( 21 Jun 2019 06:50 )             30.0     06-21    136  |  102  |  9   ----------------------------<  97  4.3   |  25  |  0.84    Ca    8.4      21 Jun 2019 06:50                    RADIOLOGY & ADDITIONAL TESTS:    Imaging Personally Reviewed:    Consultant(s) Notes Reviewed:      Care Discussed with Consultants/Other Providers: Urology PA

## 2019-06-22 ENCOUNTER — TRANSCRIPTION ENCOUNTER (OUTPATIENT)
Age: 44
End: 2019-06-22

## 2019-06-22 VITALS
TEMPERATURE: 98 F | SYSTOLIC BLOOD PRESSURE: 104 MMHG | RESPIRATION RATE: 16 BRPM | OXYGEN SATURATION: 97 % | HEART RATE: 75 BPM | DIASTOLIC BLOOD PRESSURE: 60 MMHG

## 2019-06-22 PROCEDURE — 99232 SBSQ HOSP IP/OBS MODERATE 35: CPT

## 2019-06-22 RX ORDER — IBUPROFEN 200 MG
1 TABLET ORAL
Qty: 20 | Refills: 0
Start: 2019-06-22

## 2019-06-22 RX ORDER — ACETAMINOPHEN 500 MG
3 TABLET ORAL
Qty: 0 | Refills: 0 | DISCHARGE
Start: 2019-06-22

## 2019-06-22 RX ADMIN — HEPARIN SODIUM 5000 UNIT(S): 5000 INJECTION INTRAVENOUS; SUBCUTANEOUS at 06:02

## 2019-06-22 RX ADMIN — Medication 15 MILLIGRAM(S): at 00:35

## 2019-06-22 RX ADMIN — Medication 15 MILLIGRAM(S): at 06:02

## 2019-06-22 NOTE — PROGRESS NOTE ADULT - PROBLEM SELECTOR PLAN 1
-- OOB/Ambulate  -- Pain control  -- Monitor GI Fxn  -- d/c home today  appreciate medicine hospitalist input

## 2019-06-22 NOTE — DISCHARGE NOTE PROVIDER - CARE PROVIDER_API CALL
Familia Grajeda; PETRONA)  Urology  [address] [place] [address]  Phone: [phone]  Fax: [phone]  Follow Up Time:

## 2019-06-22 NOTE — PROGRESS NOTE ADULT - SUBJECTIVE AND OBJECTIVE BOX
Subjective  No overnight events. Pt doing well. Continues to pass flatus, (+) OOB, No n/v. No problems voiding    Objective    Vital signs  T(F): , Max: 98.4 (06-22-19 @ 01:13)  HR: 75 (06-22-19 @ 09:58)  BP: 104/60 (06-22-19 @ 09:58)  SpO2: 97% (06-22-19 @ 09:58)  Wt(kg): --    Output     06-21 @ 07:01  -  06-22 @ 07:00  --------------------------------------------------------  IN: 0 mL / OUT: 1200 mL / NET: -1200 mL      Gen NAD  Abd soft, ND, NT.  Incisions c/d/i    Labs      06-21 @ 06:50    WBC 10.85 / Hct 30.0  / SCr 0.84     06-20 @ 13:30    WBC 15.10 / Hct 35.5  / SCr 0.84

## 2019-06-22 NOTE — PROGRESS NOTE ADULT - ASSESSMENT
44F anxiety/depression, asthma, in November 2018 seen at [facility] for R sided abdominal pain, and was dx with Kidney stones and Urosepsis, found to have R ureteral stricture, ended up with nephrostomy tube.  Now s/p R lap radical nephrectomy.

## 2019-06-22 NOTE — DISCHARGE NOTE PROVIDER - NSDCCPCAREPLAN_GEN_ALL_CORE_FT
PRINCIPAL DISCHARGE DIAGNOSIS  Diagnosis: Ureteral stricture, right  Assessment and Plan of Treatment: Drink plenty of fluids.  No heavy lifting (greater than 10 pounds) or straining for 4 to 6 weeks.  You may shower, just pat white strips dry, they will fall off in a few weeks.   Call Dr. Grajeda's office  to schedule a follow up appointment.  Call the office if you have fever greater than 101, difficulty urinating, pain not relieved with pain medication, nausea/vomiting.        SECONDARY DISCHARGE DIAGNOSES  Diagnosis: Anxiety and depression  Assessment and Plan of Treatment: Continue current home medications and follow up with your primary care provider      Diagnosis: Mild intermittent asthma without complication  Assessment and Plan of Treatment: Continue current home medications and follow up with your primary care provider

## 2019-06-22 NOTE — DISCHARGE NOTE PROVIDER - HOSPITAL COURSE
43 yo F  underwent lap simple fight nephrectomy on 6/20/19.  Postoperative course uneventful, successful TOV on POD #1,  pain controlled, ambulating.  Return of GI function on POD #1 , diet advanced without incident.   Pt d/c on POD #2 to f/u with Dr. Grajeda.

## 2019-06-22 NOTE — DISCHARGE NOTE PROVIDER - NSDCACTIVITY_GEN_ALL_CORE
No heavy lifting/straining/Walking - Indoors allowed/Walking - Outdoors allowed/Stairs allowed/Showering allowed

## 2019-06-25 PROBLEM — J45.909 UNSPECIFIED ASTHMA, UNCOMPLICATED: Chronic | Status: ACTIVE | Noted: 2019-06-12

## 2019-06-25 PROBLEM — F41.9 ANXIETY DISORDER, UNSPECIFIED: Chronic | Status: ACTIVE | Noted: 2019-06-12

## 2019-07-08 ENCOUNTER — APPOINTMENT (OUTPATIENT)
Dept: UROLOGY | Facility: CLINIC | Age: 44
End: 2019-07-08
Payer: COMMERCIAL

## 2019-07-08 DIAGNOSIS — N28.9 DISORDER OF KIDNEY AND URETER, UNSPECIFIED: ICD-10-CM

## 2019-07-08 PROCEDURE — 99024 POSTOP FOLLOW-UP VISIT: CPT

## 2019-07-08 NOTE — HISTORY OF PRESENT ILLNESS
[None] : no symptoms [FreeTextEntry1] : Previous patient of Dr stephany Mix . Recurrent stricture disease . He placed a nephrostomy tube was placed . He referred her to Dr Grajeda for definitive treatment . Due to extensive scarring and disease a nephrectomy was performed . Benign pathology

## 2019-07-08 NOTE — ASSESSMENT
[FreeTextEntry1] : Senia is slowly progressing . Her wounds are still  healing .She is still experiencing fatigue . I assured her these\par symptoms will improve . I did labs on her today to check her creatinine . I will extend her TOM at this time and see her in 4 weeks if she is not improving . \par I discussed a kidney healthy diet . No added salt , modified protein and hydration . She can start cardio if she wants but no lifting at this time . restrictions for another 3 weeks .

## 2019-07-08 NOTE — REVIEW OF SYSTEMS
[Feeling Poorly] : feeling poorly [Feeling Tired] : feeling tired [see HPI] : see HPI [Skin Wound] : skin wound [Negative] : Psychiatric

## 2019-07-08 NOTE — PHYSICAL EXAM
[General Appearance - Well Developed] : well developed [General Appearance - Well Nourished] : well nourished [Bowel Sounds] : normal bowel sounds [Abdomen Soft] : soft [Skin Color & Pigmentation] : normal skin color and pigmentation [Skin Turgor] : supple [] : no respiratory distress [Oriented To Time, Place, And Person] : oriented to person, place, and time [Normal Station and Gait] : the gait and station were normal for the patient's age [No Focal Deficits] : no focal deficits [No Palpable Adenopathy] : no palpable adenopathy [FreeTextEntry1] : incisions with slight redness and scabs

## 2019-07-09 LAB
ANION GAP SERPL CALC-SCNC: 13 MMOL/L
BUN SERPL-MCNC: 17 MG/DL
CALCIUM SERPL-MCNC: 9.5 MG/DL
CHLORIDE SERPL-SCNC: 104 MMOL/L
CO2 SERPL-SCNC: 23 MMOL/L
CREAT SERPL-MCNC: 0.99 MG/DL
GLUCOSE SERPL-MCNC: 85 MG/DL
POTASSIUM SERPL-SCNC: 4.4 MMOL/L
SODIUM SERPL-SCNC: 140 MMOL/L

## 2019-07-19 ENCOUNTER — OTHER (OUTPATIENT)
Age: 44
End: 2019-07-19

## 2019-08-12 ENCOUNTER — RX RENEWAL (OUTPATIENT)
Age: 44
End: 2019-08-12

## 2019-08-12 RX ORDER — NORETHINDRONE ACETATE AND ETHINYL ESTRADIOL AND FERROUS FUMARATE 1MG-20(21)
1-20 KIT ORAL DAILY
Qty: 84 | Refills: 0 | Status: ACTIVE | COMMUNITY
Start: 2018-04-11 | End: 1900-01-01

## 2019-10-07 NOTE — H&P PST ADULT - PRO PAIN EXPRESSION
----- Message from Elizabeth Olmstead MD sent at 10/5/2019  7:29 PM CDT -----  No infection  
Informed pt's  of message below.  
verbalization

## 2019-10-22 ENCOUNTER — RX CHANGE (OUTPATIENT)
Age: 44
End: 2019-10-22

## 2019-10-22 DIAGNOSIS — N39.0 URINARY TRACT INFECTION, SITE NOT SPECIFIED: ICD-10-CM

## 2019-10-22 DIAGNOSIS — R31.9 URINARY TRACT INFECTION, SITE NOT SPECIFIED: ICD-10-CM

## 2019-12-09 ENCOUNTER — APPOINTMENT (OUTPATIENT)
Dept: OBGYN | Facility: CLINIC | Age: 44
End: 2019-12-09
Payer: COMMERCIAL

## 2019-12-09 VITALS
BODY MASS INDEX: 19.63 KG/M2 | SYSTOLIC BLOOD PRESSURE: 112 MMHG | DIASTOLIC BLOOD PRESSURE: 76 MMHG | WEIGHT: 115 LBS | HEIGHT: 64 IN

## 2019-12-09 DIAGNOSIS — Z30.430 ENCOUNTER FOR INSERTION OF INTRAUTERINE CONTRACEPTIVE DEVICE: ICD-10-CM

## 2019-12-09 PROCEDURE — 58300 INSERT INTRAUTERINE DEVICE: CPT | Mod: 53

## 2019-12-09 RX ORDER — CIPROFLOXACIN HYDROCHLORIDE 500 MG/1
500 TABLET, FILM COATED ORAL
Qty: 14 | Refills: 0 | Status: DISCONTINUED | COMMUNITY
Start: 2019-10-22 | End: 2019-12-09

## 2019-12-09 RX ORDER — NORETHINDRONE ACETATE AND ETHINYL ESTRADIOL AND FERROUS FUMARATE 1.5-30(21)
1.5-3 KIT ORAL DAILY
Qty: 90 | Refills: 3 | Status: ACTIVE | COMMUNITY
Start: 2019-07-22 | End: 1900-01-01

## 2020-01-10 ENCOUNTER — APPOINTMENT (OUTPATIENT)
Dept: UROLOGY | Facility: CLINIC | Age: 45
End: 2020-01-10
Payer: COMMERCIAL

## 2020-01-10 ENCOUNTER — OUTPATIENT (OUTPATIENT)
Dept: OUTPATIENT SERVICES | Facility: HOSPITAL | Age: 45
LOS: 1 days | End: 2020-01-10
Payer: COMMERCIAL

## 2020-01-10 DIAGNOSIS — Z98.890 OTHER SPECIFIED POSTPROCEDURAL STATES: Chronic | ICD-10-CM

## 2020-01-10 DIAGNOSIS — N13.5 CROSSING VESSEL AND STRICTURE OF URETER W/OUT HYDRONEPHROSIS: ICD-10-CM

## 2020-01-10 DIAGNOSIS — Z96.0 PRESENCE OF UROGENITAL IMPLANTS: Chronic | ICD-10-CM

## 2020-01-10 DIAGNOSIS — Z98.891 HISTORY OF UTERINE SCAR FROM PREVIOUS SURGERY: Chronic | ICD-10-CM

## 2020-01-10 DIAGNOSIS — R35.0 FREQUENCY OF MICTURITION: ICD-10-CM

## 2020-01-10 PROCEDURE — 76775 US EXAM ABDO BACK WALL LIM: CPT | Mod: 26

## 2020-01-10 PROCEDURE — 76775 US EXAM ABDO BACK WALL LIM: CPT

## 2020-01-10 PROCEDURE — 99213 OFFICE O/P EST LOW 20 MIN: CPT | Mod: 25

## 2020-01-10 NOTE — ASSESSMENT
[FreeTextEntry1] : 43 yo female s/p simple nephrectomy without issues on ultrasound today. Patient can f/u PRN

## 2020-01-10 NOTE — HISTORY OF PRESENT ILLNESS
[FreeTextEntry1] : 43 yo female with chronic stricture disease and atrophic kidney s/p simple nephrectomy. Ultrasound done today without any significant findings.

## 2020-01-10 NOTE — PHYSICAL EXAM
[Normal Appearance] : normal appearance [Abdomen Soft] : soft [Abdomen Tenderness] : non-tender [Well Groomed] : well groomed [] : no rash [Edema] : no peripheral edema [Exaggerated Use Of Accessory Muscles For Inspiration] : no accessory muscle use [Normal Station and Gait] : the gait and station were normal for the patient's age [Affect] : the affect was normal

## 2020-01-16 DIAGNOSIS — N13.5 CROSSING VESSEL AND STRICTURE OF URETER WITHOUT HYDRONEPHROSIS: ICD-10-CM

## 2020-02-10 ENCOUNTER — LABORATORY RESULT (OUTPATIENT)
Age: 45
End: 2020-02-10

## 2020-02-10 ENCOUNTER — APPOINTMENT (OUTPATIENT)
Dept: OBGYN | Facility: CLINIC | Age: 45
End: 2020-02-10
Payer: COMMERCIAL

## 2020-02-10 VITALS
HEIGHT: 64 IN | BODY MASS INDEX: 19.29 KG/M2 | WEIGHT: 113 LBS | SYSTOLIC BLOOD PRESSURE: 108 MMHG | DIASTOLIC BLOOD PRESSURE: 58 MMHG

## 2020-02-10 DIAGNOSIS — Z01.419 ENCOUNTER FOR GYNECOLOGICAL EXAMINATION (GENERAL) (ROUTINE) W/OUT ABNORMAL FINDINGS: ICD-10-CM

## 2020-02-10 DIAGNOSIS — Z30.41 ENCOUNTER FOR SURVEILLANCE OF CONTRACEPTIVE PILLS: ICD-10-CM

## 2020-02-10 PROCEDURE — 99396 PREV VISIT EST AGE 40-64: CPT

## 2020-02-10 RX ORDER — MONTELUKAST SODIUM 10 MG/1
TABLET, FILM COATED ORAL
Refills: 0 | Status: ACTIVE | COMMUNITY

## 2020-02-10 RX ORDER — ALBUTEROL SULFATE 5 MG/ML
SOLUTION, NON-ORAL INHALATION
Refills: 0 | Status: ACTIVE | COMMUNITY

## 2020-02-10 RX ORDER — SERTRALINE HYDROCHLORIDE 25 MG/1
TABLET, FILM COATED ORAL
Refills: 0 | Status: ACTIVE | COMMUNITY

## 2020-02-10 RX ORDER — NITROFURANTOIN (MONOHYDRATE/MACROCRYSTALS) 25; 75 MG/1; MG/1
100 CAPSULE ORAL
Qty: 14 | Refills: 0 | Status: DISCONTINUED | COMMUNITY
Start: 2019-06-10 | End: 2020-02-10

## 2020-02-10 RX ORDER — NYSTATIN 100000 [USP'U]/G
100000 CREAM TOPICAL TWICE DAILY
Qty: 1 | Refills: 3 | Status: DISCONTINUED | COMMUNITY
Start: 2018-09-20 | End: 2020-02-10

## 2020-02-10 RX ORDER — BUSPIRONE HCL 5 MG
TABLET ORAL
Refills: 0 | Status: ACTIVE | COMMUNITY

## 2020-02-10 RX ORDER — NORETHINDRONE ACETATE AND ETHINYL ESTRADIOL AND FERROUS FUMARATE 1.5-30(21)
1.5-3 KIT ORAL DAILY
Qty: 90 | Refills: 1 | Status: DISCONTINUED | COMMUNITY
Start: 2018-05-14 | End: 2020-02-10

## 2020-02-24 LAB
CYTOLOGY CVX/VAG DOC THIN PREP: ABNORMAL
HPV HIGH+LOW RISK DNA PNL CVX: DETECTED

## 2020-03-16 ENCOUNTER — APPOINTMENT (OUTPATIENT)
Dept: MAMMOGRAPHY | Facility: CLINIC | Age: 45
End: 2020-03-16
Payer: COMMERCIAL

## 2020-03-16 PROCEDURE — 77063 BREAST TOMOSYNTHESIS BI: CPT

## 2020-03-16 PROCEDURE — 77067 SCR MAMMO BI INCL CAD: CPT

## 2020-03-26 NOTE — PROCEDURE
[IUD Placement] : intrauterine device (IUD) placement [Prevention of Pregnancy] : prevention of pregnancy [Risks] : risks [Benefits] : benefits [Alternatives] : alternatives [Patient] : patient [Infection] : infection [Bleeding] : bleeding [Pain] : pain [Expulsion] : expulsion [Failure] : failure [Uterine Perforation] : uterine perforation [CONSENT OBTAINED] : written consent was obtained prior to the procedure. [LMP ___] : LMP was [unfilled] [Uterus Sounded to ___cm] : sounded to [unfilled]Ucm [Tenaculum] : a single toothed tenaculum [Tolerated Well] : the patient tolerated the procedure well [None] : no post-procedure medications given [No Complications] : there were no complications [de-identified] : iodine, [de-identified] : hurricaine spray [de-identified] : Addendum: 3/26/2020, attempted paragard insertion, unable to insertion paragard d/t uterus sounded to 4cm.

## 2020-03-26 NOTE — ASSESSMENT
[FreeTextEntry1] : Unable to sound uterus, unable to pass sound through internal os.\par Renew oral CHC\par ACHEs rev'd\par Pt counseled on option of rto for IUD insert on later day.\par Pt understands and desires to continue CHC at this time.\par

## 2020-12-21 PROBLEM — N39.0 URINARY TRACT INFECTION WITH HEMATURIA, SITE UNSPECIFIED: Status: RESOLVED | Noted: 2019-10-22 | Resolved: 2020-12-21

## 2020-12-23 PROBLEM — Z01.419 ENCOUNTER FOR ROUTINE GYNECOLOGICAL EXAMINATION: Status: RESOLVED | Noted: 2020-02-10 | Resolved: 2020-12-23

## 2021-03-09 NOTE — REASON FOR VISIT
----- Message from Mine Zazueta MD sent at 1/29/2021  1:34 PM EST -----  H pylori positive and multiple colon adenomas   Will send Rx for treatment and needs repeat colonoscopy as previously recommended in March/April [Initial Visit ___] : [unfilled] is here today for an initial visit  for [unfilled]

## 2021-05-10 ENCOUNTER — APPOINTMENT (OUTPATIENT)
Dept: MAMMOGRAPHY | Facility: CLINIC | Age: 46
End: 2021-05-10
Payer: COMMERCIAL

## 2021-05-10 PROCEDURE — 77063 BREAST TOMOSYNTHESIS BI: CPT

## 2021-05-10 PROCEDURE — 77067 SCR MAMMO BI INCL CAD: CPT

## 2022-04-20 ENCOUNTER — NON-APPOINTMENT (OUTPATIENT)
Age: 47
End: 2022-04-20

## 2022-04-22 ENCOUNTER — OUTPATIENT (OUTPATIENT)
Dept: OUTPATIENT SERVICES | Facility: HOSPITAL | Age: 47
LOS: 1 days | End: 2022-04-22
Payer: COMMERCIAL

## 2022-04-22 ENCOUNTER — APPOINTMENT (OUTPATIENT)
Dept: UROLOGY | Facility: CLINIC | Age: 47
End: 2022-04-22
Payer: COMMERCIAL

## 2022-04-22 DIAGNOSIS — Z98.891 HISTORY OF UTERINE SCAR FROM PREVIOUS SURGERY: Chronic | ICD-10-CM

## 2022-04-22 DIAGNOSIS — R31.9 HEMATURIA, UNSPECIFIED: ICD-10-CM

## 2022-04-22 DIAGNOSIS — Z90.5 ACQUIRED ABSENCE OF KIDNEY: ICD-10-CM

## 2022-04-22 DIAGNOSIS — R35.0 FREQUENCY OF MICTURITION: ICD-10-CM

## 2022-04-22 DIAGNOSIS — Z98.890 OTHER SPECIFIED POSTPROCEDURAL STATES: Chronic | ICD-10-CM

## 2022-04-22 DIAGNOSIS — Z96.0 PRESENCE OF UROGENITAL IMPLANTS: Chronic | ICD-10-CM

## 2022-04-22 PROCEDURE — 99213 OFFICE O/P EST LOW 20 MIN: CPT

## 2022-04-22 PROCEDURE — 76775 US EXAM ABDO BACK WALL LIM: CPT | Mod: 26

## 2022-04-22 PROCEDURE — 76775 US EXAM ABDO BACK WALL LIM: CPT

## 2022-04-22 PROCEDURE — 88112 CYTOPATH CELL ENHANCE TECH: CPT | Mod: 26

## 2022-04-22 NOTE — ASSESSMENT
[FreeTextEntry1] : 47 year old with gross hematuria in setting of UTI, and solitary kidney. US today 2.6 x 2.0 x 3.5 cm mixed echogenicity vascular focal area in the mid/upper left kidney prominence of the renal parenchyma. The left kidney is normal in size and echogenicity without stones or hydronephrosis visualized. \par \par - UA/Ucx\par - urine cytology\par - CTU\par \par  I spent 30 -minutes time today on all issues related to this encounter on today date of service including non face to face time.\par

## 2022-04-22 NOTE — HISTORY OF PRESENT ILLNESS
[FreeTextEntry1] : Right simple nephrectomy 2019 \par \par Patient reports had UTI a few months ago, and noticed gross hematuria. Resolved with antibiotics. She also reports few weeks of headache, dizziness, on and off back pain, wants to check health solitary kidney .\par \par Ultrasound today: right nephrectomy, renal fossa unremarkable. There is a 2.6 x 2.0 x 3.5 cm mixed echogenicity vascular focal area in the mid/upper left kidney prominence of the renal parenchyma, can't rule out solid lesion. The left kidney is normal in size and echogenicity without stones or hydronephrosis visualized.

## 2022-04-23 LAB
APPEARANCE: CLEAR
BILIRUBIN URINE: NEGATIVE
BLOOD URINE: NEGATIVE
COLOR: NORMAL
GLUCOSE QUALITATIVE U: NEGATIVE
KETONES URINE: NEGATIVE
LEUKOCYTE ESTERASE URINE: NEGATIVE
NITRITE URINE: NEGATIVE
PH URINE: 7.5
PROTEIN URINE: NEGATIVE
SPECIFIC GRAVITY URINE: 1.02
UROBILINOGEN URINE: NORMAL

## 2022-04-24 ENCOUNTER — TRANSCRIPTION ENCOUNTER (OUTPATIENT)
Age: 47
End: 2022-04-24

## 2022-04-24 LAB — BACTERIA UR CULT: NORMAL

## 2022-04-25 LAB — URINE CYTOLOGY: NORMAL

## 2022-04-27 ENCOUNTER — APPOINTMENT (OUTPATIENT)
Dept: CT IMAGING | Facility: CLINIC | Age: 47
End: 2022-04-27
Payer: COMMERCIAL

## 2022-04-27 ENCOUNTER — RESULT REVIEW (OUTPATIENT)
Age: 47
End: 2022-04-27

## 2022-04-27 DIAGNOSIS — Z90.5 ACQUIRED ABSENCE OF KIDNEY: ICD-10-CM

## 2022-04-27 PROCEDURE — 74178 CT ABD&PLV WO CNTR FLWD CNTR: CPT

## 2022-05-17 DIAGNOSIS — R33.8 OTHER RETENTION OF URINE: ICD-10-CM

## 2022-07-08 ENCOUNTER — APPOINTMENT (OUTPATIENT)
Dept: MAMMOGRAPHY | Facility: CLINIC | Age: 47
End: 2022-07-08

## 2022-07-08 PROCEDURE — 77063 BREAST TOMOSYNTHESIS BI: CPT

## 2022-07-08 PROCEDURE — 77067 SCR MAMMO BI INCL CAD: CPT

## 2022-10-31 NOTE — PHYSICAL EXAM
negative... [Awake] : awake [Alert] : alert [Acute Distress] : no acute distress [LAD] : no lymphadenopathy [Thyroid Nodule] : no thyroid nodule [Goiter] : no goiter [Mass] : no breast mass [Nipple Discharge] : no nipple discharge [Axillary LAD] : no axillary lymphadenopathy [Soft] : soft [Tender] : non tender [Distended] : not distended [H/Smegaly] : no hepatosplenomegaly [None] : no CVA tenderness [Oriented x3] : oriented to person, place, and time [Depressed Mood] : not depressed [Flat Affect] : affect not flat [Labia Majora] : labia major [Discharge] : no discharge [Normal] : clitoris [Labia Minora] : labia minora [Uterine Prolapse] : no uterine prolapse [Pap Obtained] : a Pap smear was performed [No Bleeding] : there was no active vaginal bleeding [Motion Tenderness] : there was no cervical motion tenderness [Uterine Adnexae] : were not tender and not enlarged [CTAB] : CTAB [RRR, No Murmurs] : RRR, no murmurs [FreeTextEntry5] : friable

## 2024-06-07 ENCOUNTER — APPOINTMENT (OUTPATIENT)
Dept: CT IMAGING | Facility: CLINIC | Age: 49
End: 2024-06-07

## 2024-08-25 NOTE — PROGRESS NOTE ADULT - SUBJECTIVE AND OBJECTIVE BOX
Patient is a 44y old  Female who presents with a chief complaint of Removal of right kidney (22 Jun 2019 12:03)      SUBJECTIVE / OVERNIGHT EVENTS: feels well, no events     ROS:  No HA/DZ  No Vision changes   No CP, SOB  No N/V/D  No Edema  No Rash  NO weakness, numbness    MEDICATIONS  (STANDING):  busPIRone 15 milliGRAM(s) Oral <User Schedule>  ethinyl  estradiol-norethindrone (JUNEL FE) 1 Tablet(s) Oral daily  heparin  Injectable 5000 Unit(s) SubCutaneous every 8 hours  ketorolac   Injectable 15 milliGRAM(s) IV Push every 6 hours  melatonin 3 milliGRAM(s) Oral at bedtime  montelukast 10 milliGRAM(s) Oral at bedtime  sertraline 100 milliGRAM(s) Oral at bedtime    MEDICATIONS  (PRN):  acetaminophen   Tablet .. 650 milliGRAM(s) Oral every 6 hours PRN Temp greater or equal to 38C (100.4F), Mild Pain (1 - 3), Moderate Pain (4 - 6)  ALBUTerol    90 MICROgram(s) HFA Inhaler 2 Puff(s) Inhalation every 6 hours PRN Shortness of Breath and/or Wheezing      T(C): 36.8 (06-22-19 @ 09:58)  HR: 75 (06-22-19 @ 09:58)  BP: 104/60 (06-22-19 @ 09:58)  RR: 16 (06-22-19 @ 09:58)  SpO2: 97% (06-22-19 @ 09:58)  CAPILLARY BLOOD GLUCOSE        I&O's Summary    21 Jun 2019 07:01  -  22 Jun 2019 07:00  --------------------------------------------------------  IN: 0 mL / OUT: 1200 mL / NET: -1200 mL        PHYSICAL EXAM:  GENERAL: NAD, well-developed, AOx3  HEAD:  Atraumatic, Normocephalic  EYES: EOMI, PERRL, conjunctiva and sclera clear  NECK: Supple, No JVD  CHEST/LUNG: Clear to auscultation bilaterally  HEART: Regular rate and rhythm; No murmurs, rubs, or gallops, No Edema  ABDOMEN: Soft, Nontender, Nondistended; Bowel sounds present  EXTREMITIES:  2+ Peripheral Pulses, No clubbing, cyanosis  PSYCH: No SI/HI  NEUROLOGY: non-focal  SKIN: No rashes or lesions    LABS:                        10.0   10.85 )-----------( 248      ( 21 Jun 2019 06:50 )             30.0     06-21    136  |  102  |  9   ----------------------------<  97  4.3   |  25  |  0.84    Ca    8.4      21 Jun 2019 06:50                    RADIOLOGY & ADDITIONAL TESTS:    Imaging Personally Reviewed:    Consultant(s) Notes Reviewed:      Care Discussed with Consultants/Other Providers: Female